# Patient Record
Sex: MALE | Race: WHITE | NOT HISPANIC OR LATINO | Employment: FULL TIME | URBAN - METROPOLITAN AREA
[De-identification: names, ages, dates, MRNs, and addresses within clinical notes are randomized per-mention and may not be internally consistent; named-entity substitution may affect disease eponyms.]

---

## 2020-08-14 ENCOUNTER — HOSPITAL ENCOUNTER (EMERGENCY)
Facility: HOSPITAL | Age: 30
Discharge: HOME | End: 2020-08-14
Attending: EMERGENCY MEDICINE | Admitting: EMERGENCY MEDICINE

## 2020-08-14 VITALS
BODY MASS INDEX: 32.56 KG/M2 | HEART RATE: 81 BPM | DIASTOLIC BLOOD PRESSURE: 74 MMHG | RESPIRATION RATE: 16 BRPM | SYSTOLIC BLOOD PRESSURE: 133 MMHG | TEMPERATURE: 98.4 F | WEIGHT: 232.59 LBS | OXYGEN SATURATION: 98 % | HEIGHT: 71 IN

## 2020-08-14 DIAGNOSIS — M54.50 LOW BACK PAIN, UNSPECIFIED BACK PAIN LATERALITY, UNSPECIFIED CHRONICITY, UNSPECIFIED WHETHER SCIATICA PRESENT: Primary | ICD-10-CM

## 2020-08-14 PROCEDURE — 25010000002 KETOROLAC TROMETHAMINE PER 15 MG: Performed by: EMERGENCY MEDICINE

## 2020-08-14 PROCEDURE — 99283 EMERGENCY DEPT VISIT LOW MDM: CPT

## 2020-08-14 PROCEDURE — 96372 THER/PROPH/DIAG INJ SC/IM: CPT

## 2020-08-14 RX ORDER — KETOROLAC TROMETHAMINE 30 MG/ML
60 INJECTION, SOLUTION INTRAMUSCULAR; INTRAVENOUS ONCE
Status: COMPLETED | OUTPATIENT
Start: 2020-08-14 | End: 2020-08-14

## 2020-08-14 RX ORDER — DEXTROAMPHETAMINE SACCHARATE, AMPHETAMINE ASPARTATE, DEXTROAMPHETAMINE SULFATE AND AMPHETAMINE SULFATE 5; 5; 5; 5 MG/1; MG/1; MG/1; MG/1
15 TABLET ORAL DAILY
COMMUNITY

## 2020-08-14 RX ORDER — NAPROXEN 375 MG/1
375 TABLET ORAL 2 TIMES DAILY PRN
Qty: 14 TABLET | Refills: 0 | Status: SHIPPED | OUTPATIENT
Start: 2020-08-14 | End: 2023-03-21

## 2020-08-14 RX ORDER — CYCLOBENZAPRINE HCL 10 MG
10 TABLET ORAL 3 TIMES DAILY PRN
Qty: 15 TABLET | Refills: 0 | Status: SHIPPED | OUTPATIENT
Start: 2020-08-14 | End: 2023-03-21

## 2020-08-14 RX ADMIN — KETOROLAC TROMETHAMINE 60 MG: 30 INJECTION, SOLUTION INTRAMUSCULAR at 13:56

## 2020-08-14 NOTE — ED NOTES
Patient was picking up a piece of machinery that weighs approx 300 lbs and his back just gave out, no hx of chronic back pain.     Sharmila Blanc, HORTENSIA  08/14/20 4535

## 2020-08-14 NOTE — ED PROVIDER NOTES
Subjective   Patient is a 30-year-old male complaining of low back pain after he did some heavy lifting at work.  He denies numbness tingling or weakness.  The pain is moderate and worse on certain motion.  Patient denies bowel or bladder abnormalities.          Review of Systems  Negative for numbness tingling weakness abdominal pain vomiting diarrhea dysuria or other complaint  Past Medical History:   Diagnosis Date   • ADHD 2020       No Known Allergies    History reviewed. No pertinent surgical history.    Family History   Problem Relation Age of Onset   • Crohn's disease Mother    • Fibromyalgia Mother    • Hyperthyroidism Mother    • Heart failure Father    • Stroke Father    • Schizophrenia Maternal Grandmother    • Chronic fatigue Maternal Grandmother    • Heart failure Maternal Grandfather    • Diabetes Paternal Grandmother    • Heart failure Paternal Grandfather        Social History     Socioeconomic History   • Marital status: Significant Other     Spouse name: Not on file   • Number of children: Not on file   • Years of education: Not on file   • Highest education level: Not on file   Tobacco Use   • Smoking status: Current Every Day Smoker     Packs/day: 1.50   Substance and Sexual Activity   • Alcohol use: Yes     Comment: once a week   • Drug use: Never   • Sexual activity: Yes     Partners: Female           Objective   Physical Exam  Back exam shows the patient have mild to moderate diffuse low back pain on palpation.  Patient has decreased range of motion due to pain.  He has negative straight leg raises with symmetrical DTRs.  Neurologic exam is nonfocal.  Procedures           ED Course                                           MDM  Number of Diagnoses or Management Options  Diagnosis management comments: Patient has a benign physical exam with no focal neurologic deficits.  Was given Toradol IM.  Will be discharged with a prescription for Naprosyn and Flexeril.  Use a heating pad and follow with  his MD in 1 week if not improving.    Risk of Complications, Morbidity, and/or Mortality  Presenting problems: moderate  Diagnostic procedures: moderate  Management options: moderate    Patient Progress  Patient progress: stable      Final diagnoses:   Low back pain, unspecified back pain laterality, unspecified chronicity, unspecified whether sciatica present            Petr Erickson MD  08/14/20 4449

## 2023-03-20 PROCEDURE — 99284 EMERGENCY DEPT VISIT MOD MDM: CPT

## 2023-03-21 ENCOUNTER — HOSPITAL ENCOUNTER (EMERGENCY)
Facility: HOSPITAL | Age: 33
Discharge: HOME | End: 2023-03-21
Attending: EMERGENCY MEDICINE | Admitting: EMERGENCY MEDICINE
Payer: COMMERCIAL

## 2023-03-21 ENCOUNTER — APPOINTMENT (OUTPATIENT)
Dept: CT IMAGING | Facility: HOSPITAL | Age: 33
End: 2023-03-21
Payer: COMMERCIAL

## 2023-03-21 VITALS
HEART RATE: 65 BPM | HEIGHT: 72 IN | DIASTOLIC BLOOD PRESSURE: 55 MMHG | RESPIRATION RATE: 16 BRPM | OXYGEN SATURATION: 96 % | BODY MASS INDEX: 31.83 KG/M2 | WEIGHT: 235 LBS | TEMPERATURE: 97.8 F | SYSTOLIC BLOOD PRESSURE: 119 MMHG

## 2023-03-21 DIAGNOSIS — R13.10 DYSPHAGIA, UNSPECIFIED TYPE: ICD-10-CM

## 2023-03-21 DIAGNOSIS — J02.9 SORE THROAT: Primary | ICD-10-CM

## 2023-03-21 DIAGNOSIS — K11.20 PAROTIDITIS: ICD-10-CM

## 2023-03-21 LAB
ALBUMIN SERPL-MCNC: 4.4 G/DL (ref 3.5–5.2)
ALBUMIN/GLOB SERPL: 1.6 G/DL
ALP SERPL-CCNC: 70 U/L (ref 39–117)
ALT SERPL W P-5'-P-CCNC: 28 U/L (ref 1–41)
ANION GAP SERPL CALCULATED.3IONS-SCNC: 7.8 MMOL/L (ref 5–15)
AST SERPL-CCNC: 24 U/L (ref 1–40)
BASOPHILS # BLD AUTO: 0.04 10*3/MM3 (ref 0–0.2)
BASOPHILS NFR BLD AUTO: 0.4 % (ref 0–1.5)
BILIRUB SERPL-MCNC: 0.7 MG/DL (ref 0–1.2)
BUN SERPL-MCNC: 11 MG/DL (ref 6–20)
BUN/CREAT SERPL: 12.5 (ref 7–25)
CALCIUM SPEC-SCNC: 9.8 MG/DL (ref 8.6–10.5)
CHLORIDE SERPL-SCNC: 103 MMOL/L (ref 98–107)
CO2 SERPL-SCNC: 30.2 MMOL/L (ref 22–29)
CREAT SERPL-MCNC: 0.88 MG/DL (ref 0.76–1.27)
DEPRECATED RDW RBC AUTO: 40.9 FL (ref 37–54)
EGFRCR SERPLBLD CKD-EPI 2021: 117.9 ML/MIN/1.73
EOSINOPHIL # BLD AUTO: 0.29 10*3/MM3 (ref 0–0.4)
EOSINOPHIL NFR BLD AUTO: 2.9 % (ref 0.3–6.2)
ERYTHROCYTE [DISTWIDTH] IN BLOOD BY AUTOMATED COUNT: 12 % (ref 12.3–15.4)
GLOBULIN UR ELPH-MCNC: 2.7 GM/DL
GLUCOSE SERPL-MCNC: 123 MG/DL (ref 65–99)
HCT VFR BLD AUTO: 44.4 % (ref 37.5–51)
HGB BLD-MCNC: 14.9 G/DL (ref 13–17.7)
IMM GRANULOCYTES # BLD AUTO: 0.03 10*3/MM3 (ref 0–0.05)
IMM GRANULOCYTES NFR BLD AUTO: 0.3 % (ref 0–0.5)
LYMPHOCYTES # BLD AUTO: 3.5 10*3/MM3 (ref 0.7–3.1)
LYMPHOCYTES NFR BLD AUTO: 34.8 % (ref 19.6–45.3)
MCH RBC QN AUTO: 31.1 PG (ref 26.6–33)
MCHC RBC AUTO-ENTMCNC: 33.6 G/DL (ref 31.5–35.7)
MCV RBC AUTO: 92.7 FL (ref 79–97)
MONOCYTES # BLD AUTO: 0.81 10*3/MM3 (ref 0.1–0.9)
MONOCYTES NFR BLD AUTO: 8 % (ref 5–12)
NEUTROPHILS NFR BLD AUTO: 5.4 10*3/MM3 (ref 1.7–7)
NEUTROPHILS NFR BLD AUTO: 53.6 % (ref 42.7–76)
NRBC BLD AUTO-RTO: 0 /100 WBC (ref 0–0.2)
PLATELET # BLD AUTO: 196 10*3/MM3 (ref 140–450)
PMV BLD AUTO: 9.8 FL (ref 6–12)
POTASSIUM SERPL-SCNC: 4.1 MMOL/L (ref 3.5–5.2)
PROT SERPL-MCNC: 7.1 G/DL (ref 6–8.5)
RBC # BLD AUTO: 4.79 10*6/MM3 (ref 4.14–5.8)
SODIUM SERPL-SCNC: 141 MMOL/L (ref 136–145)
WBC NRBC COR # BLD: 10.07 10*3/MM3 (ref 3.4–10.8)

## 2023-03-21 PROCEDURE — 80053 COMPREHEN METABOLIC PANEL: CPT | Performed by: EMERGENCY MEDICINE

## 2023-03-21 PROCEDURE — 25510000001 IOPAMIDOL 61 % SOLUTION: Performed by: EMERGENCY MEDICINE

## 2023-03-21 PROCEDURE — 85025 COMPLETE CBC W/AUTO DIFF WBC: CPT | Performed by: EMERGENCY MEDICINE

## 2023-03-21 PROCEDURE — 70491 CT SOFT TISSUE NECK W/DYE: CPT

## 2023-03-21 RX ORDER — SODIUM CHLORIDE 0.9 % (FLUSH) 0.9 %
10 SYRINGE (ML) INJECTION AS NEEDED
Status: DISCONTINUED | OUTPATIENT
Start: 2023-03-21 | End: 2023-03-21 | Stop reason: HOSPADM

## 2023-03-21 RX ORDER — FAMOTIDINE 20 MG/1
20 TABLET, FILM COATED ORAL 2 TIMES DAILY
Qty: 28 TABLET | Refills: 0 | Status: SHIPPED | OUTPATIENT
Start: 2023-03-21 | End: 2023-04-04

## 2023-03-21 RX ADMIN — IOPAMIDOL 75 ML: 612 INJECTION, SOLUTION INTRAVENOUS at 02:52

## 2023-03-21 NOTE — ED TRIAGE NOTES
Pt c/o lt tonsillar abscess that is not getting any better s/p finishing abx tx last Wednesday. Pt was told to come to ER if symptoms did not resolve.

## 2023-03-21 NOTE — ED PROVIDER NOTES
MD ATTESTATION NOTE    The RAISA and I have discussed this patient's history, physical exam, and treatment plan.    I provided a substantive portion of the care of this patient. I personally performed the physical exam, in its entirety. The attached note describes my personal findings.      Luisito Ching is a 32 y.o. male who presents to the ED c/o prior history of pharyngeal abscess.  States he had a abscess which drained spontaneously about a month ago.  States that last couple days has had some sore throat and feels like there is something deeper in his throat.  Reports some pain with swallowing.  No difficulty speaking or breathing.  No voice changes.      On exam:  GENERAL: not distressed  HENT: nares patent, oropharynx and tonsils are erythematous there is no significant swelling no uvular shift no visible exudate or abscess  EYES: no scleral icterus  CV: regular rhythm, regular rate  RESPIRATORY: normal effort  ABDOMEN: soft  MUSCULOSKELETAL: no deformity  NEURO: alert, moves all extremities, follows commands  SKIN: warm, dry    Labs  Recent Results (from the past 24 hour(s))   Comprehensive Metabolic Panel    Collection Time: 03/21/23  2:07 AM    Specimen: Blood   Result Value Ref Range    Glucose 123 (H) 65 - 99 mg/dL    BUN 11 6 - 20 mg/dL    Creatinine 0.88 0.76 - 1.27 mg/dL    Sodium 141 136 - 145 mmol/L    Potassium 4.1 3.5 - 5.2 mmol/L    Chloride 103 98 - 107 mmol/L    CO2 30.2 (H) 22.0 - 29.0 mmol/L    Calcium 9.8 8.6 - 10.5 mg/dL    Total Protein 7.1 6.0 - 8.5 g/dL    Albumin 4.4 3.5 - 5.2 g/dL    ALT (SGPT) 28 1 - 41 U/L    AST (SGOT) 24 1 - 40 U/L    Alkaline Phosphatase 70 39 - 117 U/L    Total Bilirubin 0.7 0.0 - 1.2 mg/dL    Globulin 2.7 gm/dL    A/G Ratio 1.6 g/dL    BUN/Creatinine Ratio 12.5 7.0 - 25.0    Anion Gap 7.8 5.0 - 15.0 mmol/L    eGFR 117.9 >60.0 mL/min/1.73   CBC Auto Differential    Collection Time: 03/21/23  2:07 AM    Specimen: Blood   Result Value Ref Range    WBC 10.07 3.40 -  10.80 10*3/mm3    RBC 4.79 4.14 - 5.80 10*6/mm3    Hemoglobin 14.9 13.0 - 17.7 g/dL    Hematocrit 44.4 37.5 - 51.0 %    MCV 92.7 79.0 - 97.0 fL    MCH 31.1 26.6 - 33.0 pg    MCHC 33.6 31.5 - 35.7 g/dL    RDW 12.0 (L) 12.3 - 15.4 %    RDW-SD 40.9 37.0 - 54.0 fl    MPV 9.8 6.0 - 12.0 fL    Platelets 196 140 - 450 10*3/mm3    Neutrophil % 53.6 42.7 - 76.0 %    Lymphocyte % 34.8 19.6 - 45.3 %    Monocyte % 8.0 5.0 - 12.0 %    Eosinophil % 2.9 0.3 - 6.2 %    Basophil % 0.4 0.0 - 1.5 %    Immature Grans % 0.3 0.0 - 0.5 %    Neutrophils, Absolute 5.40 1.70 - 7.00 10*3/mm3    Lymphocytes, Absolute 3.50 (H) 0.70 - 3.10 10*3/mm3    Monocytes, Absolute 0.81 0.10 - 0.90 10*3/mm3    Eosinophils, Absolute 0.29 0.00 - 0.40 10*3/mm3    Basophils, Absolute 0.04 0.00 - 0.20 10*3/mm3    Immature Grans, Absolute 0.03 0.00 - 0.05 10*3/mm3    nRBC 0.0 0.0 - 0.2 /100 WBC       Radiology  CT Soft Tissue Neck With Contrast    Result Date: 3/21/2023  CT OF THE NECK SOFT TISSUE WITH CONTRAST  HISTORY: Throat swelling and sore throat  COMPARISON: None available.  TECHNIQUE: Axial CT imaging was obtained through the neck. IV contrast was administered. Coronal and sagittal reformatted images were obtained.  FINDINGS: Visualized portions of the brain parenchyma appear unremarkable. The orbits appear unremarkable. Mucous retention cysts are noted within the left maxillary sinus. Mastoid air cells are clear. Nasopharynx appears normal. Uvula is midline. There is no prevertebral soft tissue swelling. The parapharyngeal space appears unremarkable. The epiglottis is normal. The valleculae and puriform sinuses are normal, as are the vocal cords. Thyroid gland and trachea are within normal limits. Images through the lung apices do not demonstrate any acute abnormalities. The cervical vessels appear patent. Salivary glands are grossly unremarkable. A BB has been placed superficial to the left submandibular gland, but it does not appear particularly  edematous, and the overlying platysma is within normal limits. There are shotty cervical lymph nodes. No obvious salivary duct stones are seen. Patient does appear to have some periapical lucencies, suggesting some periodontal disease, and there also appear to be some dental caries. Correlation with the dentition is recommended.       1. A BB has been placed superficial to the patient's left submandibular gland. It does not appear particularly edematous. The overlying platysma does not appear particularly thickened, and no salivary duct stones are seen. Patient does have shotty cervical lymph nodes, but no other acute abnormality is noted within the neck.  Radiation dose reduction techniques were utilized, including automated exposure control and exposure modulation based on body size.  This report was finalized on 3/21/2023 3:24 AM by Dr. Radha Law M.D.        Medications given in the ED:  Medications   iopamidol (ISOVUE-300) 61 % injection 100 mL (75 mL Intravenous Given 3/21/23 0252)       Orders placed during this visit:  Orders Placed This Encounter   Procedures   • CT Soft Tissue Neck With Contrast   • Comprehensive Metabolic Panel   • CBC Auto Differential   • Monitor Blood Pressure   • Pulse Oximetry, Continuous   • CBC & Differential       Medical Decision Making:  ED Course as of 03/21/23 0720   Tue Mar 21, 2023   0348 I viewed the patient soft tissue neck and my independent interpretation is no acute process.  The radiologist official read is as follows:IMPRESSION:     1. A BB has been placed superficial to the patient's left submandibular  gland. It does not appear particularly edematous. The overlying platysma  does not appear particularly thickened, and no salivary duct stones are  seen. Patient does have shotty cervical lymph nodes, but no other acute  abnormality is noted within the neck.    [RC]   0503 Patient is well-appearing for discharge.  Given that he states his discomfort is somewhat  deep and I suppose this could be reflux related.  Will place on H2 blocker for the next 10 days and give referral to ENT for further evaluation.  Lab return the emergency department for worsening symptoms or should he have any further concerns.  We will have him to take sialagogues/hard candy for the mild parotiditis. [RC]      ED Course User Index  [RC] Adeel Kevin III, PA             PPE: Both the patient and I wore a surgical mask throughout the entire patient encounter.     Diagnosis  Final diagnoses:   Sore throat   Dysphagia, unspecified type   Parotiditis        Daniel Montes MD  03/21/23 3636

## 2023-03-21 NOTE — ED PROVIDER NOTES
EMERGENCY DEPARTMENT ENCOUNTER    Room Number:  05/05  Date seen:  3/21/2023  PCP: Toni Hernandez PA-C      HPI:  Chief Complaint: Possible oropharyngeal abscess   A complete HPI/ROS/PMH/PSH/SH/FH are unobtainable due to: Nothing  Context: Luisito Ching is a 32 y.o. male who presents to the ED c/o possible oropharyngeal abscess.  Patient states he has a prior history.  The abscess was said to drain spontaneously about a month ago.  Patient states the last couple days he has had some sore throat and it feels like there is something deeper in his throat.  He reports some pain with swallowing.  He also reports a foul tasting mucus.  No difficulty speaking or breathing.  He is not on anticoagulant or platelet therapy.  No changes in voice.  He is here for further evaluation.    States he had been on 2 weeks of antibiotics for the oropharyngeal abscess.  He has been off the antibiotics for approximately 10 days.          PAST MEDICAL HISTORY  Active Ambulatory Problems     Diagnosis Date Noted   • No Active Ambulatory Problems     Resolved Ambulatory Problems     Diagnosis Date Noted   • No Resolved Ambulatory Problems     Past Medical History:   Diagnosis Date   • ADHD 2020         PAST SURGICAL HISTORY  History reviewed. No pertinent surgical history.      FAMILY HISTORY  Family History   Problem Relation Age of Onset   • Crohn's disease Mother    • Fibromyalgia Mother    • Hyperthyroidism Mother    • Heart failure Father    • Stroke Father    • Schizophrenia Maternal Grandmother    • Chronic fatigue Maternal Grandmother    • Heart failure Maternal Grandfather    • Diabetes Paternal Grandmother    • Heart failure Paternal Grandfather          SOCIAL HISTORY  Social History     Socioeconomic History   • Marital status: Single   Tobacco Use   • Smoking status: Every Day     Packs/day: 1.50     Types: Cigarettes   Substance and Sexual Activity   • Alcohol use: Yes     Comment: once a week   • Drug use: Never   •  Sexual activity: Yes     Partners: Female         ALLERGIES  Patient has no known allergies.        REVIEW OF SYSTEMS  Review of Systems     All systems reviewed and negative except for those discussed in HPI.       PHYSICAL EXAM  ED Triage Vitals [03/20/23 2349]   Temp Heart Rate Resp BP SpO2   97.8 °F (36.6 °C) 75 18 136/82 97 %      Temp src Heart Rate Source Patient Position BP Location FiO2 (%)   -- -- -- -- --       Physical Exam      GENERAL: Very pleasant, nontoxic, not distressed  HENT: nares patent.  Pharyngeal erythema, no obvious exudate, no soft palate swelling, obvious tonsil abscess, or uvular deviation.  Mild cervical lymphadenopathy  EYES: no scleral icterus  CV: regular rhythm, normal rate  RESPIRATORY: normal effort  ABDOMEN: soft  MUSCULOSKELETAL: no deformity  NEURO: alert, moves all extremities, follows commands  PSYCH:  calm, cooperative  SKIN: warm, dry    Vital signs and nursing notes reviewed.          LAB RESULTS  Recent Results (from the past 24 hour(s))   Comprehensive Metabolic Panel    Collection Time: 03/21/23  2:07 AM    Specimen: Blood   Result Value Ref Range    Glucose 123 (H) 65 - 99 mg/dL    BUN 11 6 - 20 mg/dL    Creatinine 0.88 0.76 - 1.27 mg/dL    Sodium 141 136 - 145 mmol/L    Potassium 4.1 3.5 - 5.2 mmol/L    Chloride 103 98 - 107 mmol/L    CO2 30.2 (H) 22.0 - 29.0 mmol/L    Calcium 9.8 8.6 - 10.5 mg/dL    Total Protein 7.1 6.0 - 8.5 g/dL    Albumin 4.4 3.5 - 5.2 g/dL    ALT (SGPT) 28 1 - 41 U/L    AST (SGOT) 24 1 - 40 U/L    Alkaline Phosphatase 70 39 - 117 U/L    Total Bilirubin 0.7 0.0 - 1.2 mg/dL    Globulin 2.7 gm/dL    A/G Ratio 1.6 g/dL    BUN/Creatinine Ratio 12.5 7.0 - 25.0    Anion Gap 7.8 5.0 - 15.0 mmol/L    eGFR 117.9 >60.0 mL/min/1.73   CBC Auto Differential    Collection Time: 03/21/23  2:07 AM    Specimen: Blood   Result Value Ref Range    WBC 10.07 3.40 - 10.80 10*3/mm3    RBC 4.79 4.14 - 5.80 10*6/mm3    Hemoglobin 14.9 13.0 - 17.7 g/dL    Hematocrit 44.4  37.5 - 51.0 %    MCV 92.7 79.0 - 97.0 fL    MCH 31.1 26.6 - 33.0 pg    MCHC 33.6 31.5 - 35.7 g/dL    RDW 12.0 (L) 12.3 - 15.4 %    RDW-SD 40.9 37.0 - 54.0 fl    MPV 9.8 6.0 - 12.0 fL    Platelets 196 140 - 450 10*3/mm3    Neutrophil % 53.6 42.7 - 76.0 %    Lymphocyte % 34.8 19.6 - 45.3 %    Monocyte % 8.0 5.0 - 12.0 %    Eosinophil % 2.9 0.3 - 6.2 %    Basophil % 0.4 0.0 - 1.5 %    Immature Grans % 0.3 0.0 - 0.5 %    Neutrophils, Absolute 5.40 1.70 - 7.00 10*3/mm3    Lymphocytes, Absolute 3.50 (H) 0.70 - 3.10 10*3/mm3    Monocytes, Absolute 0.81 0.10 - 0.90 10*3/mm3    Eosinophils, Absolute 0.29 0.00 - 0.40 10*3/mm3    Basophils, Absolute 0.04 0.00 - 0.20 10*3/mm3    Immature Grans, Absolute 0.03 0.00 - 0.05 10*3/mm3    nRBC 0.0 0.0 - 0.2 /100 WBC       Ordered the above labs and reviewed the results.        RADIOLOGY  CT Soft Tissue Neck With Contrast    Result Date: 3/21/2023  CT OF THE NECK SOFT TISSUE WITH CONTRAST  HISTORY: Throat swelling and sore throat  COMPARISON: None available.  TECHNIQUE: Axial CT imaging was obtained through the neck. IV contrast was administered. Coronal and sagittal reformatted images were obtained.  FINDINGS: Visualized portions of the brain parenchyma appear unremarkable. The orbits appear unremarkable. Mucous retention cysts are noted within the left maxillary sinus. Mastoid air cells are clear. Nasopharynx appears normal. Uvula is midline. There is no prevertebral soft tissue swelling. The parapharyngeal space appears unremarkable. The epiglottis is normal. The valleculae and puriform sinuses are normal, as are the vocal cords. Thyroid gland and trachea are within normal limits. Images through the lung apices do not demonstrate any acute abnormalities. The cervical vessels appear patent. Salivary glands are grossly unremarkable. A BB has been placed superficial to the left submandibular gland, but it does not appear particularly edematous, and the overlying platysma is within  normal limits. There are shotty cervical lymph nodes. No obvious salivary duct stones are seen. Patient does appear to have some periapical lucencies, suggesting some periodontal disease, and there also appear to be some dental caries. Correlation with the dentition is recommended.       1. A BB has been placed superficial to the patient's left submandibular gland. It does not appear particularly edematous. The overlying platysma does not appear particularly thickened, and no salivary duct stones are seen. Patient does have shotty cervical lymph nodes, but no other acute abnormality is noted within the neck.  Radiation dose reduction techniques were utilized, including automated exposure control and exposure modulation based on body size.  This report was finalized on 3/21/2023 3:24 AM by Dr. Radha Law M.D.        Ordered the above noted radiological studies. Reviewed by me in PACS.          PROCEDURES  Procedures          MEDICATIONS GIVEN IN ER  Medications   sodium chloride 0.9 % flush 10 mL (has no administration in time range)   iopamidol (ISOVUE-300) 61 % injection 100 mL (75 mL Intravenous Given 3/21/23 0252)         MEDICAL DECISION MAKING, PROGRESS, and CONSULTS    Discussion below represents my analysis of pertinent findings related to patient's condition, differential diagnosis, treatment plan and final disposition.      Orders placed during this visit:  Orders Placed This Encounter   Procedures   • CT Soft Tissue Neck With Contrast   • Comprehensive Metabolic Panel   • CBC Auto Differential   • Monitor Blood Pressure   • Cardiac Monitoring   • Pulse Oximetry, Continuous   • Insert Peripheral IV   • CBC & Differential         Additional sources:  - Discussed/obtained information from independent historians: None available additional information was obtained to confirm the patient's history.    - External (non-ED) record review: She was seen in [facility] by NICOL Alfaro.   He was diagnosed with tonsillar abscess and tonsillitis.  Strep and mono were obtained.  Patient was placed on Augmentin for 14 days.  He was to follow-up with PCP for further evaluation.  They attempted get the patient to go to the ER for further evaluation and it was declined.            - Chronic or social conditions impacting care: None        Differential diagnosis:    Peritonsillar abscess, oropharyngeal abscess, other deep abscess, dysphagia, uncomplicated pharyngitis.          Independent interpretation of labs, radiology studies, and discussions with consultants:  ED Course as of 03/21/23 0505   Tue Mar 21, 2023   0348 I viewed the patient soft tissue neck and my independent interpretation is no acute process.  The radiologist official read is as follows:IMPRESSION:     1. A BB has been placed superficial to the patient's left submandibular  gland. It does not appear particularly edematous. The overlying platysma  does not appear particularly thickened, and no salivary duct stones are  seen. Patient does have shotty cervical lymph nodes, but no other acute  abnormality is noted within the neck.    [RC]   0503 Patient is well-appearing for discharge.  Given that he states his discomfort is somewhat deep and I suppose this could be reflux related.  Will place on H2 blocker for the next 10 days and give referral to ENT for further evaluation.  Lab return the emergency department for worsening symptoms or should he have any further concerns.  We will have him to take sialagogues/hard candy for the mild parotiditis. [RC]      ED Course User Index  [RC] Adeel Kevin III, PA               PPE: The patient wore a mask throughout the entire encounter. I wore a well-fitting mask.    DIAGNOSIS  Final diagnoses:   Sore throat   Dysphagia, unspecified type   Parotiditis         DISPOSITION  DISCHARGE    Patient discharged in stable condition.    Reviewed implications of results, diagnosis, meds,  responsibility to follow up, warning signs and symptoms of possible worsening, potential complications and reasons to return to ER.    Patient/Family voiced understanding of above instructions.    Discussed plan for discharge, as there is no emergent indication for admission. Patient referred to primary care provider for BP management due to today's BP. Pt/family is agreeable and understands need for follow up and repeat testing.  Pt is aware that discharge does not mean that nothing is wrong but it indicates no emergency is present that requires admission and they must continue care with follow-up as given below or physician of their choice.     FOLLOW-UP  Tanner Gatica MD  [facility] [address] [address]  [phone]    Schedule an appointment as soon as possible for a visit   For further evaluation and treatment    Toni Hernandez PA-C  [address] [id number] [phone]    Schedule an appointment as soon as possible for a visit   For further evaluation and treatment, As needed         Medication List      New Prescriptions    famotidine 20 MG tablet  Commonly known as: PEPCID  Take 1 tablet by mouth 2 (Two) Times a Day for 14 days.           Where to Get Your Medications      You can get these medications from any pharmacy    Bring a paper prescription for each of these medications  · famotidine 20 MG tablet           Latest Documented Vital Signs:  As of 05:05 EDT  BP- 118/56 HR- 60 Temp- 97.8 °F (36.6 °C) O2 sat- 95%      --    Please note that portions of this were completed with a voice recognition program.       Note Disclaimer: At [facility], we believe that sharing information builds trust and better relationships. You are receiving this note because you are receiving care at [facility] or recently visited. It is possible you will see health information before a provider has talked with you about it. This kind of information can be easy to  misunderstand. To help you fully understand what it means for your health, we urge you to discuss this note with your provider.       Adeel Kevin III, PA  03/21/23 9876